# Patient Record
Sex: FEMALE | Race: WHITE | URBAN - METROPOLITAN AREA
[De-identification: names, ages, dates, MRNs, and addresses within clinical notes are randomized per-mention and may not be internally consistent; named-entity substitution may affect disease eponyms.]

---

## 2015-07-02 LAB
ALBUMIN SERPL-MCNC: 1.8 G/DL
BILIRUB SERPL-MCNC: 0.4 MG/DL
CHOLEST SERPL-MCNC: 226 MG/DL
HDLC SERPL-MCNC: 55 MG/DL
HEMOGLOBIN: 13.8 G/DL (ref 11.7–15.7)
LDLC SERPL CALC-MCNC: 137 MG/DL
POTASSIUM SERPL-SCNC: 4.1 MMOL/L
SODIUM SERPL-SCNC: 141 MMOL/L
TRIGL SERPL-MCNC: 169 MG/DL

## 2015-07-07 LAB
25 OH VIT D3: 25
TSH SERPL-ACNC: 3.57 MCU/ML

## 2017-02-06 ENCOUNTER — OFFICE VISIT (OUTPATIENT)
Dept: FAMILY MEDICINE | Facility: CLINIC | Age: 57
End: 2017-02-06

## 2017-02-06 VITALS
HEIGHT: 63 IN | HEART RATE: 88 BPM | BODY MASS INDEX: 21.58 KG/M2 | TEMPERATURE: 97.8 F | OXYGEN SATURATION: 99 % | SYSTOLIC BLOOD PRESSURE: 132 MMHG | WEIGHT: 121.8 LBS | DIASTOLIC BLOOD PRESSURE: 80 MMHG

## 2017-02-06 DIAGNOSIS — B96.89 BV (BACTERIAL VAGINOSIS): ICD-10-CM

## 2017-02-06 DIAGNOSIS — N76.0 BV (BACTERIAL VAGINOSIS): ICD-10-CM

## 2017-02-06 DIAGNOSIS — Z71.89 ACP (ADVANCE CARE PLANNING): Primary | ICD-10-CM

## 2017-02-06 DIAGNOSIS — R30.0 DYSURIA: ICD-10-CM

## 2017-02-06 PROBLEM — Z76.89 HEALTH CARE HOME: Status: ACTIVE | Noted: 2017-02-06

## 2017-02-06 PROBLEM — I73.00 RAYNAUD'S DISEASE WITHOUT GANGRENE: Status: ACTIVE | Noted: 2017-02-06

## 2017-02-06 LAB
ALBUMIN (URINE): ABNORMAL MG/DL
APPEARANCE UR: ABNORMAL
BACTERIA (WET PREP): ABNORMAL
BACTERIA, UR: ABNORMAL
BILIRUB UR QL: ABNORMAL
CASTS/LPF: ABNORMAL
CLUE CELLS: ABNORMAL
COLOR UR: YELLOW
EP/HPF: ABNORMAL
GLUCOSE URINE: ABNORMAL MG/DL
HGB UR QL: ABNORMAL
KETONES UR QL: ABNORMAL MG/DL
LEUKOCYTE ESTERASE - QUEST: ABNORMAL
MISC.: ABNORMAL
NITRITE UR QL STRIP: ABNORMAL
PH FLUID SOURCE: 6 (ref 3.5–4.5)
PH UR STRIP: 5.5 PH (ref 5–7)
PMNS (WET PREP): ABNORMAL
RBC, UR MICRO: ABNORMAL (ref ?–2)
SP. GRAVITY: 1.01
TRICHOMONAS VAGINALS: ABNORMAL
UROBILINOGEN UR QL STRIP: 0.2 EU/DL (ref 0.2–1)
WBC, UR MICRO: ABNORMAL (ref ?–2)
YEAST CELLS: ABNORMAL

## 2017-02-06 PROCEDURE — 99202 OFFICE O/P NEW SF 15 MIN: CPT | Performed by: PHYSICIAN ASSISTANT

## 2017-02-06 PROCEDURE — 81001 URINALYSIS AUTO W/SCOPE: CPT | Performed by: PHYSICIAN ASSISTANT

## 2017-02-06 PROCEDURE — 87077 CULTURE AEROBIC IDENTIFY: CPT | Mod: 90 | Performed by: PHYSICIAN ASSISTANT

## 2017-02-06 PROCEDURE — 87210 SMEAR WET MOUNT SALINE/INK: CPT | Performed by: PHYSICIAN ASSISTANT

## 2017-02-06 PROCEDURE — 87088 URINE BACTERIA CULTURE: CPT | Mod: 90 | Performed by: PHYSICIAN ASSISTANT

## 2017-02-06 PROCEDURE — 87086 URINE CULTURE/COLONY COUNT: CPT | Mod: 90 | Performed by: PHYSICIAN ASSISTANT

## 2017-02-06 PROCEDURE — 87186 SC STD MICRODIL/AGAR DIL: CPT | Mod: 90 | Performed by: PHYSICIAN ASSISTANT

## 2017-02-06 RX ORDER — METRONIDAZOLE 7.5 MG/G
1 GEL VAGINAL AT BEDTIME
Qty: 70 G | Refills: 0 | Status: SHIPPED | OUTPATIENT
Start: 2017-02-06 | End: 2017-02-11

## 2017-02-06 NOTE — MR AVS SNAPSHOT
After Visit Summary   2/6/2017    Rosina Hoover    MRN: [id number]           Patient Information     Date Of Birth          1960        Visit Information        Provider Department      2/6/2017 1:30 PM Nelda Lucero PA [facility]        Today's Diagnoses     ACP (advance care planning)    -  1     Dysuria         BV (bacterial vaginosis)           Care Instructions    Improving Vaginal lubrication:   Vaginal moisturizers are intended for use one or more times per week, not just during sexual activity. Many moisturizer products are available; examples include Replens  (should be used three times weekly), Me Again , Vagisil  Feminine Moisturizer, Feminease , GyneMoistrin  and K-Y  SILK-E .    In addition to using a moisturizer on a regular basis, many women also use a lubricant at the time of coitus to decrease irritation. Water soluble products include Astroglide , Slippery Stuff , and K-Y  Jelly. Silicone-based lubricants are also available (Pjur  [place], ID  Millennium), as well as one oil-based product (Elegance Women's Lubricant - not compatible with condoms).              Follow-ups after your visit        Who to contact     If you have questions or need follow up information about today's clinic visit or your schedule please contact [facility] directly at [phone].  Normal or non-critical lab and imaging results will be communicated to you by MyChart, letter or phone within 4 business days after the clinic has received the results. If you do not hear from us within 7 days, please contact the clinic through MyChart or phone. If you have a critical or abnormal lab result, we will notify you by phone as soon as possible.  Submit refill requests through [employer] or call your pharmacy and they will forward the refill request to us. Please allow 3 business days for your refill to be completed.          Additional Information About  "Your Visit        Webt[employer] Information     [employer] lets you send messages to your doctor, view your test results, renew your prescriptions, schedule appointments and more. To sign up, go to [email or web address] . Click on \"Log in\" on the left side of the screen, which will take you to the Welcome page. Then click on \"Sign up Now\" on the right side of the page.     You will be asked to enter the access code listed below, as well as some personal information. Please follow the directions to create your username and password.     Your access code is: [email or web address]  Expires: 2017  2:26 PM     Your access code will  in 90 days. If you need help or a new code, please call your [place] clinic or [phone].        Care EveryWhere ID     This is your Care EveryWhere ID. This could be used by other organizations to access your [place] medical records  [id number]        Your Vitals Were     Pulse Temperature Height BMI (Body Mass Index) Pulse Oximetry Breastfeeding?    88 97.8  F (36.6  C) (Oral) 1.588 m (5' 2.5\") 21.91 kg/m2 99% No       Blood Pressure from Last 3 Encounters:   17 132/80    Weight from Last 3 Encounters:   17 55.248 kg (121 lb 12.8 oz)              We Performed the Following     A WET PREP (BFP)     HCL  Urinalysis, Routine (BFP)     Urine Culture Aerobic Bacterial          Today's Medication Changes          These changes are accurate as of: 17  2:26 PM.  If you have any questions, ask your nurse or doctor.               Start taking these medicines.        Dose/Directions    metroNIDAZOLE 0.75 % vaginal gel   Commonly known as:  METROGEL   Used for:  BV (bacterial vaginosis)   Started by:  Nelda Lucero PA        Dose:  1 applicator   Place 1 applicator vaginally At Bedtime for 5 days   Quantity:  70 g   Refills:  0            Where to get your medicines      These medications were sent to [facility] [address] [facility] [address] AT " [facility] [address]  [address] [place], [facility] [address]    Hours:  24-hours Phone:  [phone]    - metroNIDAZOLE 0.75 % vaginal gel             Primary Care Provider Office Phone # Fax #    BEVERLY Tovar [phone]       [facility]  [address]  [facility] [address]        Thank you!     Thank you for choosing [facility]  for your care. Our goal is always to provide you with excellent care. Hearing back from our patients is one way we can continue to improve our services. Please take a few minutes to complete the written survey that you may receive in the mail after your visit with us. Thank you!             Your Updated Medication List - Protect others around you: Learn how to safely use, store and throw away your medicines at www.disposemymeds.org.          This list is accurate as of: 2/6/17  2:26 PM.  Always use your most recent med list.                   Brand Name Dispense Instructions for use    metroNIDAZOLE 0.75 % vaginal gel    METROGEL    70 g    Place 1 applicator vaginally At Bedtime for 5 days

## 2017-02-06 NOTE — PATIENT INSTRUCTIONS
Improving Vaginal lubrication:   Vaginal moisturizers are intended for use one or more times per week, not just during sexual activity. Many moisturizer products are available; examples include Replens  (should be used three times weekly), Me Again , Vagisil  Feminine Moisturizer, Feminease , GyneMoistrin  and K-Y  SILK-E .    In addition to using a moisturizer on a regular basis, many women also use a lubricant at the time of coitus to decrease irritation. Water soluble products include Astroglide , Slippery Stuff , and K-Y  Jelly. Silicone-based lubricants are also available (Pjur  [place], ID  Millennium), as well as one oil-based product (Elegance Women's Lubricant - not compatible with condoms).

## 2017-02-06 NOTE — PROGRESS NOTES
"Chief Complaint   Patient presents with     UTI     burning sensation and itching  since 02/03/2017      SUBJECTIVE  Rosina EDOUARD Sneha in for a possible UTI.  Symptoms started 3 day(s) ago. Symptoms include  dysuria, urgency and frequency  She denies back pain, nausea, vomiting and fever. She does not have a history of uti's.  She denies any unusual vaginal discharge or odor.     She is sexually active.  LMP - several years ago    Recent abx use? No  Flank pain?  No    OTC treatment? vinegar    Urgency to urinate on Friday.  Vaginal discomfort - burning.    Drank fluids    Last night woke with vaginal itching.   Internal vaginal itching.    Pt is new to our clinic.    I have reviewed the following histories: Past Medical History, Past Surgical History, Social History, Family History, Problem List, Medication List and Allergies        Current Outpatient Prescriptions   Medication     metroNIDAZOLE (METROGEL) 0.75 % vaginal gel     No current facility-administered medications for this visit.       Patient Active Problem List    Diagnosis Date Noted     ACP (advance care planning) 02/06/2017     Priority: Medium     Advance Care Planning 2/6/2017: ACP Review of Chart / Resources Provided:  Reviewed chart for advance care plan.  Rosina Hoover has no plan or code status on file. Discussed available resources and provided with information. Confirmed code status reflects current choices pending further ACP discussions.  Confirmed/documented legally designated decision makers.  Added by Raissa Sparrow MA           Raynaud's disease without gangrene 02/06/2017     Priority: Medium     Health Care Home 02/06/2017     Priority: Low       OBJECTIVE:    /80 mmHg  Pulse 88  Temp(Src) 97.8  F (36.6  C) (Oral)  Ht 1.588 m (5' 2.5\")  Wt 55.248 kg (121 lb 12.8 oz)  BMI 21.91 kg/m2  SpO2 99%  Breastfeeding? No    Patient is a 56 year old female, in no acute distress. Vitals noted  Cardiac:  Normal rhythm and " rate, no murmurs, rubs or gallops.  Lungs: clear to auscultation bilaterally; no wheezes, crackles or rhonchi  Abdomen:  Bowel sounds normal. Soft,  not distended, no masses, no hepatosplenomegaly, non-tender.  There is not CVA tenderness.      Gu: atrophic appearance of external genitalia, vaginal mucosa, and cervix. There is thin white discharge present.       Labs Resulted Today:   Results for orders placed or performed in visit on 02/06/17   HCL  Urinalysis, Routine (BFP)   Result Value Ref Range    Color Urine Yellow     Appearance Urine Slightly Cloudy     Glucose Urine Neg neg mg/dL    Bilirubin Urine Neg neg    Ketones Urine Neg neg mg/dL    Specific Gravity 1.010     Blood Urine Neg neg    pH Urine 5.5 5.0 - 7.0 pH    Albumin Urine neg neg - neg mg/dL    Urobilinogen Urine 0.2 0.2 - 1.0 EU/dL    Nitrite Urine Neg NEG    Leukocyte Esterase TRACE (A) neg - neg    Wbc, Urine Micro 2-4 (A) neg - 2    RBC Micro Urine 0-1 neg - 2    EP/HPF few     Bacteria Urine small (A) neg - neg    Casts/LPF neg     Miscellaneous neg    A WET PREP (BFP)   Result Value Ref Range    Trichomonas Vaginals neg     yeast cells neg     PMNs Wet Prep small (A)     Clue cells POS (A)     Bacteria (Wet Prep) HEAVY (A)     pH Fluid Source 6.0 (A) 3.5 - 4.5         Culture pending?  Yes      ASSESSMENT/PLAN:      1. ACP (advance care planning)    2. Dysuria    3. BV (bacterial vaginosis)      Metrogel for BV. Advised abstinence from alcohol and sexual intercourse. Advised AE of metronidazole.  Handout on BV given.    UC pending - will call if needs to start abx.       Rosina Hoover understands and agrees with the plan.  Nelda Lucero

## 2017-02-07 ENCOUNTER — TELEPHONE (OUTPATIENT)
Dept: FAMILY MEDICINE | Facility: CLINIC | Age: 57
End: 2017-02-07

## 2017-02-08 LAB — URINE VOIDED CULTURE: ABNORMAL

## 2017-02-09 ENCOUNTER — MYC MEDICAL ADVICE (OUTPATIENT)
Dept: FAMILY MEDICINE | Facility: CLINIC | Age: 57
End: 2017-02-09

## 2017-02-09 ENCOUNTER — TELEPHONE (OUTPATIENT)
Dept: FAMILY MEDICINE | Facility: CLINIC | Age: 57
End: 2017-02-09

## 2017-02-09 DIAGNOSIS — N30.00 ACUTE CYSTITIS WITHOUT HEMATURIA: Primary | ICD-10-CM

## 2017-02-09 RX ORDER — NITROFURANTOIN 25; 75 MG/1; MG/1
100 CAPSULE ORAL 2 TIMES DAILY
Qty: 14 CAPSULE | Refills: 0 | Status: SHIPPED | OUTPATIENT
Start: 2017-02-09 | End: 2017-06-15

## 2017-02-09 NOTE — TELEPHONE ENCOUNTER
Rosina called the CS line saying she did get the My Chart but would like to talk to someone.  I called and LM on  that Rx was sent to pharmacy but if she needed to talk with us to please call back.  Spoke to Rosina to expain that she has bacteria in her urine and needs an antibiotic so CER sent Macrobid to [facility].

## 2017-02-09 NOTE — TELEPHONE ENCOUNTER
Mychart sent:        Urine culture was present for bacteria.    I sent a prescription for an antibiotic called Macrobid  2x a day for 7 days.      Adverse effects include headache, GI disturbance, loss of appetite and change in urine color.    Nleda Lucero PA-C  2/9/2017

## 2017-02-10 ENCOUNTER — TELEPHONE (OUTPATIENT)
Dept: FAMILY MEDICINE | Facility: CLINIC | Age: 57
End: 2017-02-10

## 2017-02-10 NOTE — TELEPHONE ENCOUNTER
Urine culture was present for bacteria.    I sent a prescription for an antibiotic called Macrobid  2x a day for 7 days.      Adverse effects include headache, GI disturbance, loss of appetite and change in urine color.    Nelda Lucero PA-C  2/10/2017

## 2017-06-06 ENCOUNTER — TRANSFERRED RECORDS (OUTPATIENT)
Dept: FAMILY MEDICINE | Facility: CLINIC | Age: 57
End: 2017-06-06

## 2017-06-15 ENCOUNTER — MYC MEDICAL ADVICE (OUTPATIENT)
Dept: FAMILY MEDICINE | Facility: CLINIC | Age: 57
End: 2017-06-15

## 2017-06-15 ENCOUNTER — OFFICE VISIT (OUTPATIENT)
Dept: FAMILY MEDICINE | Facility: CLINIC | Age: 57
End: 2017-06-15

## 2017-06-15 VITALS
WEIGHT: 118 LBS | OXYGEN SATURATION: 98 % | DIASTOLIC BLOOD PRESSURE: 68 MMHG | HEART RATE: 78 BPM | BODY MASS INDEX: 22.28 KG/M2 | SYSTOLIC BLOOD PRESSURE: 130 MMHG | TEMPERATURE: 98.2 F | RESPIRATION RATE: 16 BRPM | HEIGHT: 61 IN

## 2017-06-15 DIAGNOSIS — Z12.31 ENCOUNTER FOR SCREENING MAMMOGRAM FOR BREAST CANCER: ICD-10-CM

## 2017-06-15 DIAGNOSIS — N95.2 ATROPHIC VAGINITIS: ICD-10-CM

## 2017-06-15 DIAGNOSIS — N64.4 MASTODYNIA: Primary | ICD-10-CM

## 2017-06-15 PROCEDURE — 99213 OFFICE O/P EST LOW 20 MIN: CPT | Performed by: FAMILY MEDICINE

## 2017-06-15 RX ORDER — ESTRADIOL 0.1 MG/G
CREAM VAGINAL
Refills: 1 | COMMUNITY
Start: 2017-05-24

## 2017-06-15 NOTE — PROGRESS NOTES
SUBJECTIVE: 57 year old female complaining of right breat pain along the bra line at the lower fold for 3 week(s).   The patient describes tender to the touch without mass/ needs a mammogram for preventative care.   The patient denies a history of trauma but wears a under wire support bra and feels it makes it worse.   Smoking history: No.   Relevant past medical history: positive for post menopausal using vaginal topical estrogen cream.    OBJECTIVE: The patient appears healthy, alert, no distress, cooperative and smiling.   EARS: negative  NOSE/SINUS: Nares normal. Septum midline. Mucosa normal. No drainage or sinus tenderness.   THROAT: normal   NECK:Neck supple. No adenopathy. Thyroid symmetric, normal size,, Carotids without bruits.   CHEST: Clear  Breasts: Breasts are symmetric.  No dominant, discrete, fixed  or suspicious masses are noted.  No nipple changes or axillary nodes. Bilateral thickened skin along the under wire bra line with tenderness. Skin intact. No palpable deformity.  Self exam is taught and encouraged.    ASSESSMENT: (N64.4) Mastodynia  (primary encounter diagnosis)  Comment: remove under wire bra. Support bras reviewed. Tylenol prn and monitor  Plan: ESTRACE VAGINAL 0.1 MG/GM cream, Mammo         diagnostic  digital (bilateral)*        Schedule routine imaging    (Z12.31) Encounter for screening mammogram for breast cancer  Plan: ESTRACE VAGINAL 0.1 MG/GM cream, Mammo         diagnostic  digital (bilateral)*

## 2017-06-15 NOTE — MR AVS SNAPSHOT
After Visit Summary   6/15/2017    Rosina Hoover    MRN: [id number]           Patient Information     Date Of Birth          1960        Visit Information        Provider Department      6/15/2017 11:15 AM Veronica Pérez MD [facility]        Today's Diagnoses     Mastodynia    -  1    Encounter for screening mammogram for breast cancer        Atrophic vaginitis          Care Instructions    Remove under wire bra/ sport's bra  Tylenol    Schedule mammogram          Follow-ups after your visit        Follow-up notes from your care team     Return if symptoms worsen or fail to improve.      Future tests that were ordered for you today     Open Future Orders        Priority Expected Expires Ordered    Mammo diagnostic  digital (bilateral)* Routine  6/15/2018 6/15/2017            Who to contact     If you have questions or need follow up information about today's clinic visit or your schedule please contact [facility] directly at [phone].  Normal or non-critical lab and imaging results will be communicated to you by [email or web address], letter or phone within 4 business days after the clinic has received the results. If you do not hear from us within 7 days, please contact the clinic through [email or web address] or phone. If you have a critical or abnormal lab result, we will notify you by phone as soon as possible.  Submit refill requests through [facility] or call your pharmacy and they will forward the refill request to us. Please allow 3 business days for your refill to be completed.          Additional Information About Your Visit        [email or web address] Information     [facility] gives you secure access to your electronic health record. If you see a primary care provider, you can also send messages to your care team and make appointments. If you have questions, please call your primary care clinic.  If you do not have a primary care provider, please call [phone] and they  "will assist you.        Care EveryWhere ID     This is your Care EveryWhere ID. This could be used by other organizations to access your [place] medical records  [id number]        Your Vitals Were     Pulse Temperature Respirations Height Pulse Oximetry BMI (Body Mass Index)    78 98.2  F (36.8  C) (Oral) 16 1.556 m (5' 1.25\") 98% 22.11 kg/m2       Blood Pressure from Last 3 Encounters:   06/15/17 130/68   02/06/17 132/80    Weight from Last 3 Encounters:   06/15/17 53.5 kg (118 lb)   02/06/17 55.2 kg (121 lb 12.8 oz)               Primary Care Provider Office Phone # Fax #    BEVERLY Tovar [phone]       [facility]  [address]  [facility] [address]        Thank you!     Thank you for choosing [facility]  for your care. Our goal is always to provide you with excellent care. Hearing back from our patients is one way we can continue to improve our services. Please take a few minutes to complete the written survey that you may receive in the mail after your visit with us. Thank you!             Your Updated Medication List - Protect others around you: Learn how to safely use, store and throw away your medicines at www.disposemymeds.org.          This list is accurate as of: 6/15/17  3:05 PM.  Always use your most recent med list.                   Brand Name Dispense Instructions for use    ESTRACE VAGINAL 0.1 MG/GM cream   Generic drug:  estradiol      INSERT ONE GRAM INTRAVAGINALLY QHS FOR 14 DAYS THEN TWICE A WEEK FOR 10 WEEKS THEN ONCE A WEEK UTD         "

## 2017-06-15 NOTE — NURSING NOTE
Patient is here for a spot on her R breast - noticed it last week.  Pre-Visit Screening :  Immunizations : will get at phy later    Colonoscopy : declined  Mammogram : declined  Asthma Action Test/Plan : ozzie  PHQ9/GAD7 :  Na  Pulse - regular  My Chart - accepts    CLASSIFICATION OF OVERWEIGHT AND OBESITY BY BMI                         Obesity Class           BMI(kg/m2)  Underweight                                    < 18.5  Normal                                         18.5-24.9  Overweight                                     25.0-29.9  OBESITY                     I                  30.0-34.9                              II                 35.0-39.9  EXTREME OBESITY             III                >40                             Patient's  BMI Body mass index is 22.11 kg/(m^2).  http://hin.nhlbi.nih.gov/menuplanner/menu.cgi  Questioned patient about current smoking habits.  Pt. has never smoked.

## 2017-06-15 NOTE — TELEPHONE ENCOUNTER
From: Rosina Hoover  To: Nelda Lucero PA  Sent: 6/15/2017 8:57 AM CDT  Subject: Records update    Have you not received my history and past records? Because this notice is inaccurate. I have had two Pap smears this year and a colonoscopy within the past three years. How do I get all my records collected to accurately reflect what has happened??  Rosina Hoover

## 2017-07-01 ENCOUNTER — HEALTH MAINTENANCE LETTER (OUTPATIENT)
Age: 57
End: 2017-07-01

## 2017-10-27 ENCOUNTER — OFFICE VISIT (OUTPATIENT)
Dept: FAMILY MEDICINE | Facility: CLINIC | Age: 57
End: 2017-10-27

## 2017-10-27 VITALS — SYSTOLIC BLOOD PRESSURE: 134 MMHG | DIASTOLIC BLOOD PRESSURE: 60 MMHG | HEART RATE: 72 BPM

## 2017-10-27 DIAGNOSIS — S61.212A LACERATION OF RIGHT MIDDLE FINGER WITHOUT DAMAGE TO NAIL, FOREIGN BODY PRESENCE UNSPECIFIED, INITIAL ENCOUNTER: Primary | ICD-10-CM

## 2017-10-27 DIAGNOSIS — Z23 NEED FOR VACCINATION: ICD-10-CM

## 2017-10-27 PROCEDURE — 12001 RPR S/N/AX/GEN/TRNK 2.5CM/<: CPT | Performed by: PHYSICIAN ASSISTANT

## 2017-10-27 PROCEDURE — 90715 TDAP VACCINE 7 YRS/> IM: CPT | Performed by: PHYSICIAN ASSISTANT

## 2017-10-27 PROCEDURE — 36415 COLL VENOUS BLD VENIPUNCTURE: CPT | Performed by: PHYSICIAN ASSISTANT

## 2017-10-27 NOTE — MR AVS SNAPSHOT
After Visit Summary   10/27/2017    Rosina Hoover    MRN: [id number]           Patient Information     Date Of Birth          1960        Visit Information        Provider Department      10/27/2017 2:15 PM Jacki Agosto PA-C [facility]        Today's Diagnoses     Laceration of right middle finger without damage to nail, foreign body presence unspecified, initial encounter    -  1    Need for vaccination           Follow-ups after your visit        Follow-up notes from your care team     Return in about 10 days (around 11/6/2017).      Your next 10 appointments already scheduled     Nov 06, 2017 11:15 AM CST   Office Visit with BEVERLY Tovar   [facility] ([facility])    [address]  [address]  [facility] [address] [phone]              Who to contact     If you have questions or need follow up information about today's clinic visit or your schedule please contact [facility] directly at [phone].  Normal or non-critical lab and imaging results will be communicated to you by [employer], letter or phone within 4 business days after the clinic has received the results. If you do not hear from us within 7 days, please contact the clinic through [employer] or phone. If you have a critical or abnormal lab result, we will notify you by phone as soon as possible.  Submit refill requests through [employer] or call your pharmacy and they will forward the refill request to us. Please allow 3 business days for your refill to be completed.          Additional Information About Your Visit        [employer] Information     [employer] gives you secure access to your electronic health record. If you see a primary care provider, you can also send messages to your care team and make appointments. If you have questions, please call your primary care clinic.  If you do not have a primary  care provider, please call [phone] and they will assist you.        Care EveryWhere ID     This is your Care EveryWhere ID. This could be used by other organizations to access your [place] medical records  [id number]        Your Vitals Were     Pulse                   72            Blood Pressure from Last 3 Encounters:   10/27/17 134/60   06/15/17 130/68   02/06/17 132/80    Weight from Last 3 Encounters:   06/15/17 53.5 kg (118 lb)   02/06/17 55.2 kg (121 lb 12.8 oz)              We Performed the Following     REPAIR SUPERFICIAL, WOUND BODY < =2.5CM     TDAP VACCINE (BOOSTRIX)     VENOUS COLLECTION        Primary Care Provider Office Phone # Fax #    BEVERLY Tovar [phone] [address]  [facility] [address]        Equal Access to Services     [facility]: Hadii aad ku hadasho Soomaali, waaxda luqadaha, qaybta kaalmada adeegyada, waxay eulaliain hayjuan danieln kate guy . So [facility] [place] [phone].    ATENCIÓN: Si habla español, tiene a castillo disposición servicios gratuitos de asistencia lingüística. Llame al [phone].    We comply with applicable federal civil rights laws and [place] laws. We do not discriminate on the basis of race, color, national origin, age, disability, sex, sexual orientation, or gender identity.            Thank you!     Thank you for choosing [facility]  for your care. Our goal is always to provide you with excellent care. Hearing back from our patients is one way we can continue to improve our services. Please take a few minutes to complete the written survey that you may receive in the mail after your visit with us. Thank you!             Your Updated Medication List - Protect others around you: Learn how to safely use, store and throw away your medicines at www.disposemymeds.org.          This list is accurate as of: 10/27/17 11:11 PM.  Always use your most recent med list.                   Brand Name Dispense  Instructions for use Diagnosis    ESTRACE VAGINAL 0.1 MG/GM cream   Generic drug:  estradiol      INSERT ONE GRAM INTRAVAGINALLY QHS FOR 14 DAYS THEN TWICE A WEEK FOR 10 WEEKS THEN ONCE A WEEK UTD    Atrophic vaginitis

## 2017-10-27 NOTE — NURSING NOTE
Rosina NUNN Sneha is here for a left finger laceration.   Questioned patient about current smoking habits.  Pt. has never smoked.  PULSE regular  My Chart: active  CLASSIFICATION OF OVERWEIGHT AND OBESITY BY BMI                        Obesity Class           BMI(kg/m2)  Underweight                                    < 18.5  Normal                                         18.5-24.9  Overweight                                     25.0-29.9  OBESITY                     I                  30.0-34.9                             II                 35.0-39.9  EXTREME OBESITY             III                >40                            Patient's  BMI There is no height or weight on file to calculate BMI.  http://hin.nhlbi.nih.gov/menuplanner/menu.cgi  Pre-visit planning  Immunizations - up to date  Colonoscopy - declines  Mammogram - declines  Asthma -   PHQ9 -    CHRIS-7 -

## 2017-10-28 NOTE — PROGRESS NOTES
CC: Finger laceration    History:  Earlier today linger was cutting onions and accidentally cut the tip of the 3rd digit of her right hand. It was bleeding a lot at first, but now has slowed down.     Rosina also mentions she had Raynaud's Disease.    PMH, MEDICATIONS, ALLERGIES, SOCIAL AND FAMILY HISTORY in EPIC and reviewed by me personally.      ROS negative other than the symptoms noted above in the HPI.        Examination   /60 (BP Location: Right arm, Patient Position: Chair, Cuff Size: Adult Regular)  Pulse 72       Constitutional: Sitting comfortably, in no acute distress. Vital signs noted  Cardiovascular:  regular rate and rhythm, no murmurs, clicks, or gallops  Respiratory:  normal respiratory rate and rhythm, lungs clear to auscultation  M/S: ROM of 3rd digit of right hand still intact.  NEURO:  muscle strength normal, sensation to light touch and pinprick normal  SKIN: No jaundice/pallor. Laceration on anterior surface of right 3rd digit.   Psychiatric: mentation appears normal and affect normal/bright    Procedure: soaked wound in iodine solution for 15 minutes. Injected 2% lidocaine. Sterile field applied. Placed 4 interrupted sutures with 4.0 Ethilon. Covered with pressure bandage. Patient tolerated well. Gave wound care instructions.    A/P    ICD-10-CM    1. Laceration of right middle finger without damage to nail, foreign body presence unspecified, initial encounter S61.212A    2. Need for vaccination Z23 TDAP VACCINE (BOOSTRIX)     VENOUS COLLECTION     REPAIR SUPERFICIAL, WOUND BODY < =2.5CM       DISCUSSION: Rosina tolerated procedure well, and laceration was well-approximated. She should return to clinic in 7-10 days for suture removal. She should monitor for signs of infection, and contact our clinic with any concerns. Will update tetanus vaccine today.    follow up visit: As needed    Jacki Agosto PA-C  [facility]

## 2017-11-06 ENCOUNTER — OFFICE VISIT (OUTPATIENT)
Dept: FAMILY MEDICINE | Facility: CLINIC | Age: 57
End: 2017-11-06

## 2017-11-06 VITALS
WEIGHT: 121 LBS | HEART RATE: 66 BPM | TEMPERATURE: 96.4 F | DIASTOLIC BLOOD PRESSURE: 78 MMHG | BODY MASS INDEX: 22.68 KG/M2 | SYSTOLIC BLOOD PRESSURE: 138 MMHG

## 2017-11-06 DIAGNOSIS — Z48.02 ENCOUNTER FOR REMOVAL OF SUTURES: Primary | ICD-10-CM

## 2017-11-06 PROCEDURE — 99207 ZZC NO CHARGE LOS: CPT | Performed by: PHYSICIAN ASSISTANT

## 2017-11-06 NOTE — MR AVS SNAPSHOT
After Visit Summary   11/6/2017    Rosina Hoover    MRN: [id number]           Patient Information     Date Of Birth          1960        Visit Information        Provider Department      11/6/2017 11:15 AM Nelda Lucero PA [facility]        Today's Diagnoses     Encounter for removal of sutures    -  1       Follow-ups after your visit        Who to contact     If you have questions or need follow up information about today's clinic visit or your schedule please contact [facility] directly at [phone].  Normal or non-critical lab and imaging results will be communicated to you by Mobile Authenticationhart, letter or phone within 4 business days after the clinic has received the results. If you do not hear from us within 7 days, please contact the clinic through Qwik[employer] or phone. If you have a critical or abnormal lab result, we will notify you by phone as soon as possible.  Submit refill requests through [employer] or call your pharmacy and they will forward the refill request to us. Please allow 3 business days for your refill to be completed.          Additional Information About Your Visit        MyChart Information     [employer] gives you secure access to your electronic health record. If you see a primary care provider, you can also send messages to your care team and make appointments. If you have questions, please call your primary care clinic.  If you do not have a primary care provider, please call [phone] and they will assist you.        Care EveryWhere ID     This is your Care EveryWhere ID. This could be used by other organizations to access your [place] medical records  [id number]        Your Vitals Were     Pulse Temperature Breastfeeding? BMI (Body Mass Index)          66 96.4  F (35.8  C) (Oral) No 22.68 kg/m2         Blood Pressure from Last 3 Encounters:   11/06/17 138/78   10/27/17 134/60   06/15/17 130/68    Weight from  Last 3 Encounters:   11/06/17 54.9 kg (121 lb)   06/15/17 53.5 kg (118 lb)   02/06/17 55.2 kg (121 lb 12.8 oz)              Today, you had the following     No orders found for display       Primary Care Provider Office Phone # Fax #    BEVERLY Tovar [phone] [address]  [facility] [address]        Equal Access to Services     KRAIG LAYTON : Hadii aad ku hadasho Soomaali, waaxda luqadaha, qaybta kaalmada adeegyada, waxay idiin hayaan adeeg kharash la'juan danieln . So [facility] [phone].    ATENCIÓN: Si habla español, tiene a castillo disposición servicios gratuitos de asistencia lingüística. Llame al [phone].    We comply with applicable federal civil rights laws and [place] laws. We do not discriminate on the basis of race, color, national origin, age, disability, sex, sexual orientation, or gender identity.            Thank you!     Thank you for choosing [facility]  for your care. Our goal is always to provide you with excellent care. Hearing back from our patients is one way we can continue to improve our services. Please take a few minutes to complete the written survey that you may receive in the mail after your visit with us. Thank you!             Your Updated Medication List - Protect others around you: Learn how to safely use, store and throw away your medicines at www.disposemymeds.org.          This list is accurate as of: 11/6/17 11:59 PM.  Always use your most recent med list.                   Brand Name Dispense Instructions for use Diagnosis    ESTRACE VAGINAL 0.1 MG/GM cream   Generic drug:  estradiol      INSERT ONE GRAM INTRAVAGINALLY QHS FOR 14 DAYS THEN TWICE A WEEK FOR 10 WEEKS THEN ONCE A WEEK UTD    Atrophic vaginitis

## 2017-11-06 NOTE — NURSING NOTE
Rosina is here for left middle finger suture removal    Pre-Visit Screening :  Immunizations : up to date    Colonoscopy : is up to date  Mammogram : is up to date  Asthma Action Test/Plan : BERNICE  PHQ9/GAD7 :  NA    Pulse - regular  My Chart - accepts    CLASSIFICATION OF OVERWEIGHT AND OBESITY BY BMI                         Obesity Class           BMI(kg/m2)  Underweight                                    < 18.5  Normal                                         18.5-24.9  Overweight                                     25.0-29.9  OBESITY                     I                  30.0-34.9                              II                 35.0-39.9  EXTREME OBESITY             III                >40                             Patient's  BMI Body mass index is 22.68 kg/(m^2).  http://hin.nhlbi.nih.gov/menuplanner/menu.cgi  Questioned patient about current smoking habits.  Pt. has never smoked.

## 2017-11-06 NOTE — PROGRESS NOTES
Suture removal  Chief Complaint   Patient presents with     Suture Removal     pt is here to remove left middle finger sutures       Subjective:  Rosina Hoover who presents for suture removal.  Sitches were placed on 10/27 following cutting finger while slicing onion.  There have not been any problems with the suture site.    Objective:  /78 (BP Location: Left arm, Patient Position: Chair, Cuff Size: Adult Regular)  Pulse 66  Temp 96.4  F (35.8  C) (Oral)  Wt 54.9 kg (121 lb)  Breastfeeding? No  BMI 22.68 kg/m2  There are 4 sutures in the left middle finger.   The site appears well healed.  The stitches were removed without difficulty.  Steri-strips were applied at this time.        Assessment:  Suture Removal    Plan    The following information was discussed with the patient:     Keep Steri-Strips dry for 5-7 days.     If the edges of the Steri-Strips begin to come loose, trim the free ends with a pair of scissors. Do  not pull on the loose edges of the strips, as the cut could reopen.    Steri strips will usually fall off by themselves after 10-12 days.     If the Steri-Strips are still in place after 12 days, they can be removed by soaking them with 1/2  peroxide and 1/2 water, then gently lifting them off. Again, do not pull hard on the strips to  remove them.    Call the clinic right away if you notice:  Increased drainage or bleeding from the wound   Redness in or around the wound   Foul odor or pus coming from the wound   Fever above 101.0 F or shaking chills      Nelda Lucero

## 2019-06-25 ENCOUNTER — TELEPHONE (OUTPATIENT)
Dept: FAMILY MEDICINE | Facility: CLINIC | Age: 59
End: 2019-06-25

## 2019-06-25 NOTE — TELEPHONE ENCOUNTER
Rosina called today wanting some advice about her toe.  She stubbed her 2nd digit and it is bruised and very painful.  I let her know she could come in to have it xray'ed and checked, but if she just wanted to marcelina tape it and explained how to do so, she could watch it for a couple days, ice it, elevate and take ibuprofen for the pain.  I explained symptoms to check if it gets worse to please call to make an appt.  Patient will watch for a couple of days and if not better, will come in for an appt.

## 2019-10-09 ENCOUNTER — OFFICE VISIT (OUTPATIENT)
Dept: FAMILY MEDICINE | Facility: CLINIC | Age: 59
End: 2019-10-09

## 2019-10-09 VITALS
DIASTOLIC BLOOD PRESSURE: 72 MMHG | HEART RATE: 90 BPM | TEMPERATURE: 97.9 F | OXYGEN SATURATION: 97 % | BODY MASS INDEX: 22.53 KG/M2 | WEIGHT: 120.2 LBS | SYSTOLIC BLOOD PRESSURE: 122 MMHG

## 2019-10-09 DIAGNOSIS — R60.0 LIP EDEMA: Primary | ICD-10-CM

## 2019-10-09 DIAGNOSIS — R73.09 ABNORMAL GLUCOSE: ICD-10-CM

## 2019-10-09 DIAGNOSIS — Z13.228 SCREENING FOR METABOLIC DISORDER: ICD-10-CM

## 2019-10-09 DIAGNOSIS — Z13.220 SCREENING FOR LIPID DISORDERS: ICD-10-CM

## 2019-10-09 LAB
ALBUMIN SERPL-MCNC: 4.9 G/DL (ref 3.6–5.1)
ALBUMIN/GLOB SERPL: 1.6 {RATIO} (ref 1–2.5)
ALP SERPL-CCNC: 79 U/L (ref 33–130)
ALT 1742-6: 24 U/L (ref 5–30)
AST 1920-8: 17 U/L (ref 7–31)
BILIRUB SERPL-MCNC: 0.5 MG/DL (ref 0.2–1.2)
BUN SERPL-MCNC: 11 MG/DL (ref 7–25)
BUN/CREATININE RATIO: 15.5 (ref 6–22)
CALCIUM SERPL-MCNC: 9.4 MG/DL (ref 8.6–10.3)
CHLORIDE SERPLBLD-SCNC: 105.1 MMOL/L (ref 98–110)
CHOLEST SERPL-MCNC: 244 MG/DL (ref 0–199)
CHOLEST/HDLC SERPL: 4 {RATIO} (ref 0–5)
CO2 SERPL-SCNC: 27.4 MMOL/L (ref 20–32)
CREAT SERPL-MCNC: 0.71 MG/DL (ref 0.7–1.18)
ERYTHROCYTE [DISTWIDTH] IN BLOOD BY AUTOMATED COUNT: 13 %
GLOBULIN, CALCULATED - QUEST: 3 (ref 1.9–3.7)
GLUCOSE SERPL-MCNC: 121 MG/DL (ref 60–99)
HCT VFR BLD AUTO: 41 % (ref 35–47)
HDLC SERPL-MCNC: 58 MG/DL (ref 40–150)
HEMOGLOBIN: 13.4 G/DL (ref 11.7–15.7)
LDLC SERPL CALC-MCNC: 158 MG/DL (ref 0–130)
MCH RBC QN AUTO: 29.2 PG (ref 26–33)
MCHC RBC AUTO-ENTMCNC: 32.7 G/DL (ref 31–36)
MCV RBC AUTO: 89.4 FL (ref 78–100)
PLATELET COUNT - QUEST: 273 10^9/L (ref 150–375)
POTASSIUM SERPL-SCNC: 4.39 MMOL/L (ref 3.5–5.3)
PROT SERPL-MCNC: 7.9 G/DL (ref 6.1–8.1)
RBC # BLD AUTO: 4.59 10*12/L (ref 3.8–5.2)
SODIUM SERPL-SCNC: 140.5 MMOL/L (ref 135–146)
TRIGL SERPL-MCNC: 139 MG/DL (ref 0–149)
WBC # BLD AUTO: 7.2 10*9/L (ref 4–11)

## 2019-10-09 PROCEDURE — 36415 COLL VENOUS BLD VENIPUNCTURE: CPT | Performed by: PHYSICIAN ASSISTANT

## 2019-10-09 PROCEDURE — 85027 COMPLETE CBC AUTOMATED: CPT | Performed by: PHYSICIAN ASSISTANT

## 2019-10-09 PROCEDURE — 99213 OFFICE O/P EST LOW 20 MIN: CPT | Performed by: PHYSICIAN ASSISTANT

## 2019-10-09 PROCEDURE — 83036 HEMOGLOBIN GLYCOSYLATED A1C: CPT | Performed by: PHYSICIAN ASSISTANT

## 2019-10-09 PROCEDURE — 80061 LIPID PANEL: CPT | Performed by: PHYSICIAN ASSISTANT

## 2019-10-09 PROCEDURE — 80053 COMPREHEN METABOLIC PANEL: CPT | Performed by: PHYSICIAN ASSISTANT

## 2019-10-09 NOTE — PROGRESS NOTES
"CC: Lip issue    History:  3 days ago, Monday morning Rosina woke up with both lips very swollen, but had already had irritation for several weeks. Lips were also red in color on the inside, and felt an irritated sensation. Had worn tooth guard that night after taking a break from it, but was not a new tooth guard. Since then, has noticed especially acidic foods will irritate the area on both upper and lower inside lips. No tongue or mouth pain. When she rubs tongue against inner lips \"feels like metal\". No tooth pain, jaw pain, fever, change in sweats, chills, weight changes. Took Benadryl Monday, but not since. No drainage or discrete lesion.     Day before onset, had breakfast at [employer]. After leaving stopped and had eggs, croissant, but had a new green tea. Had a family gathering that night with beef sandwiches, butter, corn, dill pickles, Strongbow cider all of which she had before.     PMH, MEDICATIONS, ALLERGIES, SOCIAL AND FAMILY HISTORY in [facility] and reviewed by me personally.      ROS negative other than the symptoms noted above in the HPI.        Examination   /72 (BP Location: Right arm, Patient Position: Sitting, Cuff Size: Adult Large)   Pulse 90   Temp 97.9  F (36.6  C) (Oral)   Wt 54.5 kg (120 lb 3.2 oz)   SpO2 97%   BMI 22.53 kg/m         Constitutional: Sitting comfortably, in no acute distress. Vital signs noted  Eyes: pupils equal round reactive to light and accomodation, extra ocular movements intact  Ears: external canals and TMs free of abnormalities  Nose: patent, without mucosal abnormalities  Mouth and throat: without erythema or lesions of the mucosa.   Neck:  no adenopathy, trachea midline and normal to palpation  Cardiovascular:  regular rate and rhythm, no murmurs, clicks, or gallops  Respiratory:  normal respiratory rate and rhythm, lungs clear to auscultation  SKIN: No jaundice/pallor/rash. Lips with mild edema. No erythema, discharge.  Psychiatric: mentation appears normal " and affect normal/bright        A/P    ICD-10-CM    1. Lip edema R60.0 VENOUS COLLECTION     VITAMIN B12 ([facility])     HEMOGRAM/PLATELET ([facility])     TSH with free T4 reflex ([facility])   2. Screening for lipid disorders Z13.220 VENOUS COLLECTION     Lipid Panel ([facility])   3. Screening for metabolic disorder Z13.228 VENOUS COLLECTION     Comprehensive Metobolic Panel ([facility])       DISCUSSION:  Given lip edema, most likely allergic reaction to green tea, allergies, weather change, but difficult to say confidently what was cause. Recommended Benadryl and daytime antihistamine. Will check CBC, B12, CMP, and lipid panel as patient is fasting, and contact with results when available. She will contact me if symptoms not improving gradually or if worsening.     follow up visit: As needed    Jacki Agosto PA-C  [facility]

## 2019-10-09 NOTE — NURSING NOTE
Rosina is here today for a problem on her inner lip.    Pre-visit Screening:  Immunizations:  up to date  Colonoscopy:  is up to date  Mammogram: is up to date  Asthma Action Test/Plan:  BERNIEC  PHQ9:  PHQ 2 done today  GAD7:  NA  Questioned patient about current smoking habits Pt. has never smoked.  Ok to leave detailed message on voice mail for today's visit only Yes, phone # [phone]

## 2019-10-10 LAB
HBA1C MFR BLD: 5.9 % (ref 4–7)
TSH SERPL-ACNC: 3.07 MIU/L (ref 0.4–4.5)
VIT B12 SERPL-MCNC: 682 PG/ML (ref 200–1100)

## 2020-02-08 ENCOUNTER — HEALTH MAINTENANCE LETTER (OUTPATIENT)
Age: 60
End: 2020-02-08

## 2020-11-07 ENCOUNTER — HEALTH MAINTENANCE LETTER (OUTPATIENT)
Age: 60
End: 2020-11-07

## 2021-03-27 ENCOUNTER — HEALTH MAINTENANCE LETTER (OUTPATIENT)
Age: 61
End: 2021-03-27

## 2021-09-05 ENCOUNTER — HEALTH MAINTENANCE LETTER (OUTPATIENT)
Age: 61
End: 2021-09-05

## 2022-02-20 ENCOUNTER — HEALTH MAINTENANCE LETTER (OUTPATIENT)
Age: 62
End: 2022-02-20

## 2022-04-17 ENCOUNTER — HEALTH MAINTENANCE LETTER (OUTPATIENT)
Age: 62
End: 2022-04-17

## 2022-10-29 ENCOUNTER — HEALTH MAINTENANCE LETTER (OUTPATIENT)
Age: 62
End: 2022-10-29

## 2023-06-24 ENCOUNTER — HEALTH MAINTENANCE LETTER (OUTPATIENT)
Age: 63
End: 2023-06-24

## 2024-03-30 ENCOUNTER — HEALTH MAINTENANCE LETTER (OUTPATIENT)
Age: 64
End: 2024-03-30

## 2024-06-17 PROBLEM — Z76.89 HEALTH CARE HOME: Status: RESOLVED | Noted: 2017-02-06 | Resolved: 2024-06-17
